# Patient Record
Sex: MALE | Race: OTHER
[De-identification: names, ages, dates, MRNs, and addresses within clinical notes are randomized per-mention and may not be internally consistent; named-entity substitution may affect disease eponyms.]

---

## 2020-01-15 PROBLEM — Z00.00 ENCOUNTER FOR PREVENTIVE HEALTH EXAMINATION: Status: ACTIVE | Noted: 2020-01-15

## 2020-01-26 DIAGNOSIS — Z86.39 PERSONAL HISTORY OF OTHER ENDOCRINE, NUTRITIONAL AND METABOLIC DISEASE: ICD-10-CM

## 2020-01-26 DIAGNOSIS — Q21.2 ATRIOVENTRICULAR SEPTAL DEFECT: ICD-10-CM

## 2020-01-26 DIAGNOSIS — Z86.79 PERSONAL HISTORY OF OTHER DISEASES OF THE CIRCULATORY SYSTEM: ICD-10-CM

## 2020-01-26 DIAGNOSIS — Q90.9 DOWN SYNDROME, UNSPECIFIED: ICD-10-CM

## 2020-01-28 DIAGNOSIS — Z87.2 PERSONAL HISTORY OF DISEASES OF THE SKIN AND SUBCUTANEOUS TISSUE: ICD-10-CM

## 2020-01-30 ENCOUNTER — APPOINTMENT (OUTPATIENT)
Dept: CARDIOLOGY | Facility: CLINIC | Age: 25
End: 2020-01-30
Payer: COMMERCIAL

## 2020-01-30 ENCOUNTER — NON-APPOINTMENT (OUTPATIENT)
Age: 25
End: 2020-01-30

## 2020-01-30 VITALS
WEIGHT: 227 LBS | HEIGHT: 60 IN | TEMPERATURE: 97.9 F | DIASTOLIC BLOOD PRESSURE: 86 MMHG | OXYGEN SATURATION: 96 % | SYSTOLIC BLOOD PRESSURE: 122 MMHG | BODY MASS INDEX: 44.57 KG/M2 | HEART RATE: 71 BPM

## 2020-01-30 DIAGNOSIS — I34.0 NONRHEUMATIC MITRAL (VALVE) INSUFFICIENCY: ICD-10-CM

## 2020-01-30 DIAGNOSIS — I45.10 UNSPECIFIED RIGHT BUNDLE-BRANCH BLOCK: ICD-10-CM

## 2020-01-30 DIAGNOSIS — R06.02 SHORTNESS OF BREATH: ICD-10-CM

## 2020-01-30 DIAGNOSIS — Q24.9 CONGENITAL MALFORMATION OF HEART, UNSPECIFIED: ICD-10-CM

## 2020-01-30 PROCEDURE — 99205 OFFICE O/P NEW HI 60 MIN: CPT

## 2020-01-30 PROCEDURE — 93000 ELECTROCARDIOGRAM COMPLETE: CPT

## 2020-02-01 PROBLEM — R06.02 SHORTNESS OF BREATH: Status: ACTIVE | Noted: 2020-02-01

## 2020-02-01 PROBLEM — I45.10 RIGHT BUNDLE BRANCH BLOCK (RBBB): Status: ACTIVE | Noted: 2020-02-01

## 2020-02-01 PROBLEM — I34.0 MITRAL REGURGITATION: Status: ACTIVE | Noted: 2020-01-26

## 2020-02-01 PROBLEM — I45.10 RIGHT BUNDLE BRANCH BLOCK: Status: RESOLVED | Noted: 2020-02-01 | Resolved: 2020-02-01

## 2020-02-01 PROBLEM — Q24.9 CONGENITAL HEART DISEASE: Status: ACTIVE | Noted: 2020-01-26

## 2020-02-01 NOTE — DISCUSSION/SUMMARY
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 Surgical intervention was performed at 5 months of age. Since that time has been no cardiac-related event. In 11/18 echocardiogram demonstrated mild mitral tricuspid and aortic insufficiency. Right and left ventricular systolic function was normal. The present cardiac his examination is not suggestive of hemodynamically significant valvular pathology. Echocardiography will be helpful to reassess left ventricular and valvular function.\par \par I have recommended the following:\par 1 echocardiogram\par 2 Routine laboratory studies including lipid profile electrolytes glucose levels etc. through primary care Dr. Atkinson\par \par \par \par Right bundle branch block\par The presence of a right bundle branch block and left axis deviation are consistent with conduction system disease secondary to underlying congenital heart disease following surgical correction. Permanent pacemaker implantation is not required in the absence of symptomatic bradycardia or high degree AV block.\par \par I have recommended the following:\par 1 no further cardiac testing for this problem at this time\par \par \par \par \par Shortness of breath\par The working diagnosis is dyspnea on exertion secondary to obesity and deconditioning. The history is consistent with this diagnosis. The differential diagnosis would include left ventricular systolic dysfunction/cardiomyopathy. However the physical findings and history are not suggestive of this diagnosis.\par \par \par I have recommended the following:\par 1. Low-salt low-fat low-cholesterol diet. Regular aerobic exercise. Weight loss\par 2 echocardiogram\par \par \par \par \par Obesity\par Obesity exacerbates Jagjit's  cardiovascular issues. Today Mr. San is 5 feet tall and weighs 227 pounds. Diet exercise and weight loss are advised.\par \par \par \par \par \par The diagnosis, prognosis,risks  options and alternatives were explained at length to both the patient and his family. All questions were answered. Issues discussed included congenital heart disease/mitral insufficiency right bundle branch block/abnormal electrocardiogram shortness of breath and obesity.\par \par

## 2020-02-01 NOTE — PHYSICAL EXAM
[Normal Conjunctiva] : the conjunctiva exhibited no abnormalities [Heart Rate And Rhythm] : heart rate and rhythm were normal [Heart Sounds] : normal S1 and S2 [Auscultation Breath Sounds / Voice Sounds] : lungs were clear to auscultation bilaterally [Bowel Sounds] : normal bowel sounds [Abdomen Soft] : soft [Abnormal Walk] : normal gait [Abdomen Tenderness] : non-tender [Affect] : the affect was normal [] : no rash [FreeTextEntry1] : pleasant

## 2020-02-01 NOTE — HISTORY OF PRESENT ILLNESS
[FreeTextEntry1] : 24-year-old man\par Cardiology consultation requested because of endocardial cushion defect/congenital heart disease.\par \par Mr. San was born with a trisomy 21 defect/Down's syndrome with an associated endocardial cushion defect. Surgical intervention was performed at 5 months of age. Since that time he's been followed regularly by cardiology/echocardiograms.\par \par Jagjit denies chest pain, palpitations or syncope. Dyspnea on exertion is attributed to obesity and is unchanged from in the past.\par \par There is no history of diabetes hypertension or hyperlipidemia.\par \darrell Danielson presents today for cardiovascular evaluation. He is accompanied by his mother

## 2020-02-01 NOTE — REVIEW OF SYSTEMS
[Feeling Fatigued] : feeling fatigued [Shortness Of Breath] : shortness of breath [see HPI] : see HPI [Joint Pain] : joint pain [Negative] : Heme/Lymph

## 2020-02-20 ENCOUNTER — APPOINTMENT (OUTPATIENT)
Dept: CARDIOLOGY | Facility: CLINIC | Age: 25
End: 2020-02-20

## 2021-11-02 ENCOUNTER — APPOINTMENT (OUTPATIENT)
Dept: BARIATRICS/WEIGHT MGMT | Facility: CLINIC | Age: 26
End: 2021-11-02

## 2021-12-28 ENCOUNTER — APPOINTMENT (OUTPATIENT)
Dept: BARIATRICS/WEIGHT MGMT | Facility: CLINIC | Age: 26
End: 2021-12-28

## 2022-02-07 ENCOUNTER — APPOINTMENT (OUTPATIENT)
Dept: BARIATRICS/WEIGHT MGMT | Facility: CLINIC | Age: 27
End: 2022-02-07

## 2022-06-30 ENCOUNTER — APPOINTMENT (OUTPATIENT)
Dept: BARIATRICS/WEIGHT MGMT | Facility: CLINIC | Age: 27
End: 2022-06-30

## 2022-06-30 ENCOUNTER — TRANSCRIPTION ENCOUNTER (OUTPATIENT)
Age: 27
End: 2022-06-30

## 2022-06-30 VITALS
HEART RATE: 68 BPM | HEIGHT: 60 IN | DIASTOLIC BLOOD PRESSURE: 84 MMHG | BODY MASS INDEX: 48.59 KG/M2 | WEIGHT: 247.5 LBS | OXYGEN SATURATION: 97 % | TEMPERATURE: 97.6 F | RESPIRATION RATE: 18 BRPM | SYSTOLIC BLOOD PRESSURE: 110 MMHG

## 2022-06-30 DIAGNOSIS — E55.9 VITAMIN D DEFICIENCY, UNSPECIFIED: ICD-10-CM

## 2022-06-30 PROCEDURE — 99203 OFFICE O/P NEW LOW 30 MIN: CPT

## 2022-06-30 NOTE — HISTORY OF PRESENT ILLNESS
[FreeTextEntry1] : 27 year old male accompanied by his mother for weight management consultation.\par Patient has struggled with his weight for approx 5 years.  Has not tried any weight loss programs/diets in the past\par Lowest weight in the past 3 years 240 pounds, highest weight 247\darrell Lives with his mother.  \darrell Attends a program daily.  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  Wakes up daily at 7AM for his program\darrell Had been exercising at the gym prior to COVID under supervision of an aid\par Drinks diet soda during the day\par Patient feels he can handle his weight by himself.  Started doing some exercises once weekly- strengthening at home.\par +h/o prediabetes 2 years ago.  Due for a physical this year with Dr. Atkinson at [facility] in a few weeks. \par BCA- 40.7% fat, fat mass 100.8 pounds, , .8 pounds, TBW % 48.3, BMR 2078

## 2022-06-30 NOTE — PHYSICAL EXAM
[Obese, well nourished, in no acute distress] : obese, well nourished, in no acute distress [Normal] : no stigmata of Cushing Syndrome [de-identified] : 1/6 murmur

## 2022-06-30 NOTE — ASSESSMENT
[FreeTextEntry1] : 27 year old male with morbid obesity, h/o prediabetes and down syndrome\par Discussion with patient about how his excess weight can negatively impact his health\par Will eat breakfast daily +protein, focus on having a prepared snack in the evening with smaller portion of sweets and fruit.  Find ways to increase vegetables such as carrots/celery and hummus at lunch\par Will find a new aid to assist in going to the gym for supervised exercise multiple times a week- mixed cardio and strength training\par Can consider medications pending lab results- possibly metformin or a GLP-1RA- need to work on lifestyle changes first \par Will refer to RD\par Mother inquired about bariatric surgery- he qualifies by weight but would need his buy in which is not the case at this time\par F/U 2 weeks

## 2022-07-08 ENCOUNTER — TRANSCRIPTION ENCOUNTER (OUTPATIENT)
Age: 27
End: 2022-07-08

## 2022-07-08 LAB
25(OH)D3 SERPL-MCNC: 30.5 NG/ML
ALBUMIN SERPL ELPH-MCNC: 4 G/DL
ALP BLD-CCNC: 95 U/L
ALT SERPL-CCNC: 31 U/L
ANION GAP SERPL CALC-SCNC: 9 MMOL/L
AST SERPL-CCNC: 27 U/L
BASOPHILS # BLD AUTO: 0.05 K/UL
BASOPHILS NFR BLD AUTO: 1 %
BILIRUB SERPL-MCNC: 0.6 MG/DL
BUN SERPL-MCNC: 13 MG/DL
CALCIUM SERPL-MCNC: 8.9 MG/DL
CHLORIDE SERPL-SCNC: 105 MMOL/L
CHOLEST SERPL-MCNC: 147 MG/DL
CO2 SERPL-SCNC: 27 MMOL/L
CREAT SERPL-MCNC: 1.01 MG/DL
EGFR: 105 ML/MIN/1.73M2
EOSINOPHIL # BLD AUTO: 0.02 K/UL
EOSINOPHIL NFR BLD AUTO: 0.4 %
ESTIMATED AVERAGE GLUCOSE: 111 MG/DL
GLUCOSE SERPL-MCNC: 104 MG/DL
HBA1C MFR BLD HPLC: 5.5 %
HCT VFR BLD CALC: 45.3 %
HDLC SERPL-MCNC: 40 MG/DL
HGB BLD-MCNC: 14.9 G/DL
IMM GRANULOCYTES NFR BLD AUTO: 0.2 %
LDLC SERPL CALC-MCNC: 81 MG/DL
LYMPHOCYTES # BLD AUTO: 2.07 K/UL
LYMPHOCYTES NFR BLD AUTO: 42.7 %
MAN DIFF?: NORMAL
MCHC RBC-ENTMCNC: 32.9 GM/DL
MCHC RBC-ENTMCNC: 33.7 PG
MCV RBC AUTO: 102.5 FL
MONOCYTES # BLD AUTO: 0.53 K/UL
MONOCYTES NFR BLD AUTO: 10.9 %
NEUTROPHILS # BLD AUTO: 2.17 K/UL
NEUTROPHILS NFR BLD AUTO: 44.8 %
NONHDLC SERPL-MCNC: 107 MG/DL
PLATELET # BLD AUTO: 225 K/UL
POTASSIUM SERPL-SCNC: 4.4 MMOL/L
PROT SERPL-MCNC: 7.1 G/DL
RBC # BLD: 4.42 M/UL
RBC # FLD: 13.8 %
SODIUM SERPL-SCNC: 141 MMOL/L
TRIGL SERPL-MCNC: 130 MG/DL
TSH SERPL-ACNC: 6.79 UIU/ML
WBC # FLD AUTO: 4.85 K/UL

## 2022-07-15 ENCOUNTER — APPOINTMENT (OUTPATIENT)
Dept: BARIATRICS/WEIGHT MGMT | Facility: CLINIC | Age: 27
End: 2022-07-15

## 2022-07-15 DIAGNOSIS — R73.03 PREDIABETES.: ICD-10-CM

## 2022-07-15 PROCEDURE — 99212 OFFICE O/P EST SF 10 MIN: CPT | Mod: 95

## 2022-07-15 NOTE — ASSESSMENT
[FreeTextEntry1] : 27 year old male with h/o Down Syndrome, prediabetes now resolved, elevated TSH and morbid obesity\par Reinforced dietary changes- will work with RD\par Will have TFT's redrawn at PCP or at [facility]- if abnormal discussed initiating treatment with levothyroxine and s/s of hypothyroidism\par Continue to work to increase exercise\par F/U with me PRN

## 2022-07-15 NOTE — HISTORY OF PRESENT ILLNESS
[FreeTextEntry1] : 27 year old male accompanied by his mother for weight management consultation.\par Patient has struggled with his weight for approx 5 years.  Has not tried any weight loss programs/diets in the past\par Lowest weight in the past 3 years 240 pounds, highest weight 247\darrell Lives with his mother.  \darrell Attends a program daily.  Mother packs him breakfast (banana), packs lunch 3x a week, he purchases fast food through his program 2x a week (chicken nuggets and french fries), mother cooks dinner fish, vegeatbles, sweet potato, large snack in the evening- ice cream 3 servings or waffles (2) around 10pm.\par Stays up late a night- plays [employer], watches you tube until 2AM.  Wakes up daily at 7AM for his program\par Had been exercising at the gym prior to COVID under supervision of an aid\par Drinks diet soda during the day\par Patient feels he can handle his weight by himself.  Started doing some exercises once weekly- strengthening at home.\par +h/o prediabetes 2 years ago.  Due for a physical this year with Dr. Atkinson at [facility] in a few weeks. \par BCA- 40.7% fat, fat mass 100.8 pounds, , .8 pounds, TBW % 48.3, BMR 2078\par \par 7/15/22- Patient for virtual visit today.  He is not present as he is already on the bus going to his program.  His mother wants to proceed forward to discuss his progress.  Jagjit has declined to be weighed since his last visit with me.  He has cut down on his soda, simple carbohydrate rich foods, fatty foods, increased his salads with dinner, decreased his nighttime snacking.  His mother has met with the  to discuss avoiding fast food during the day.  Has been walking with his program but no formal exercise.  His mother is concerned about disrupting his routine too quickly.  Has visit with his PCP next week.  Did not receive my messages about elevated TSH level.

## 2022-07-15 NOTE — REASON FOR VISIT
[Home] : at home, [unfilled] , at the time of the visit. [Other Location: e.g. Home (Enter Location, City,State)___] : at [unfilled] [FreeTextEntry2] : Milly San [FreeTextEntry3] : Mother

## 2022-07-26 ENCOUNTER — APPOINTMENT (OUTPATIENT)
Dept: BARIATRICS/WEIGHT MGMT | Facility: CLINIC | Age: 27
End: 2022-07-26

## 2022-12-10 ENCOUNTER — APPOINTMENT (OUTPATIENT)
Dept: BARIATRICS/WEIGHT MGMT | Facility: CLINIC | Age: 27
End: 2022-12-10

## 2022-12-10 VITALS — BODY MASS INDEX: 49.41 KG/M2 | WEIGHT: 253 LBS

## 2022-12-10 PROCEDURE — 99214 OFFICE O/P EST MOD 30 MIN: CPT | Mod: 95

## 2022-12-10 RX ORDER — CONTAINER,EMPTY
EACH MISCELLANEOUS
Qty: 1 | Refills: 0 | Status: ACTIVE | COMMUNITY
Start: 2022-12-10 | End: 1900-01-01

## 2022-12-10 RX ORDER — ISOPROPYL ALCOHOL 70 ML/100ML
SWAB TOPICAL
Qty: 1 | Refills: 3 | Status: ACTIVE | COMMUNITY
Start: 2022-12-10 | End: 1900-01-01

## 2022-12-10 NOTE — HISTORY OF PRESENT ILLNESS
[FreeTextEntry1] : 27 year old male accompanied by his mother for weight management consultation.\par Patient has struggled with his weight for approx 5 years.  Has not tried any weight loss programs/diets in the past\par Lowest weight in the past 3 years 240 pounds, highest weight 247\darrell Lives with his mother.  \darrell Attends a program daily.  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  Wakes up daily at 7AM for his program\par Had been exercising at the gym prior to COVID under supervision of an aid\par Drinks diet soda during the day\par Patient feels he can handle his weight by himself.  Started doing some exercises once weekly- strengthening at home.\par +h/o prediabetes 2 years ago.  Due for a physical this year with Dr. Atkinson at [facility] in a few weeks. \par BCA- 40.7% fat, fat mass 100.8 pounds, , .8 pounds, TBW % 48.3, BMR 2078\par \par 7/15/22- Patient for virtual visit today.  He is not present as he is already on the bus going to his program.  His mother wants to proceed forward to discuss his progress.  Jagjit has declined to be weighed since his last visit with me.  He has cut down on his soda, simple carbohydrate rich foods, fatty foods, increased his salads with dinner, decreased his nighttime snacking.  His mother has met with the  to discuss avoiding fast food during the day.  Has been walking with his program but no formal exercise.  His mother is concerned about disrupting his routine too quickly.  Has visit with his PCP next week.  Did not receive my messages about elevated TSH level.  \par \par 12/10/22- Patient has been taking levothyroxine 25mcg/day repeat TFTs pending.  +6 pounds.  Not exercising.  Eating patterns are the same- program administrators have said they are going to help but have not made a meaningful impact.  +evening eating.  This session was with the patient's mother/HCP/Guardian

## 2022-12-10 NOTE — ASSESSMENT
[FreeTextEntry1] : 27 year old male with down syndrome and morbid obesity\par Milly is interested in weight loss medications for Jagjit.  She is currently enrolled in our program and taking Saxenda with good effect.  Discussed unique issues of starting weight loss medications for Jagjit including compliance, risk of dehydration, GI side effects if eating unhealthy foods, ignoring satiety signals\par Discussed important foundation for weight loss is exercising and healthier diet but practical issues are preventing success\par Patient has known congenital heart issues and risk of increased weight may exacerbate risk of heart disease \par Decided that we can try once weekly GLP-1 (Wegovy) once TFT's are confirmed to be WNL- reviewed possible side effects of Wegovy including nausea, vomiting, diarrhea, constipation, GERD, pancreatitis, MTC, gall stones, and dehydration.  Reviewed injection sites, technique, storage, needle safety.\par She will discuss these possible side effects and overall weight loss goals with Jagjit who was not available for today's visit.  I offered to discuss these with him myself and answer any questions.\par F/U 1 month

## 2022-12-10 NOTE — REASON FOR VISIT
[Home] : at home, [unfilled] , at the time of the visit. [Other Location: e.g. Home (Enter Location, City,State)___] : at [unfilled] [FreeTextEntry2] : Milly San [FreeTextEntry3] : Mother  [Follow-Up] : a follow-up visit

## 2023-03-03 ENCOUNTER — APPOINTMENT (OUTPATIENT)
Dept: BARIATRICS/WEIGHT MGMT | Facility: CLINIC | Age: 28
End: 2023-03-03
Payer: COMMERCIAL

## 2023-03-03 VITALS — WEIGHT: 223 LBS | BODY MASS INDEX: 43.55 KG/M2

## 2023-03-03 PROCEDURE — 99213 OFFICE O/P EST LOW 20 MIN: CPT | Mod: 95

## 2023-03-03 NOTE — REASON FOR VISIT
[Home] : at home, [unfilled] , at the time of the visit. [Other Location: e.g. Home (Enter Location, City,State)___] : at [unfilled] [Mother] : mother [FreeTextEntry2] : Milly San [FreeTextEntry3] : Mother/Legal guardian [Follow-Up] : a follow-up visit

## 2023-03-03 NOTE — ASSESSMENT
[FreeTextEntry1] : 27 year old male with morbid obesity\par Patient is well over targets of weight loss- we will continue Wegovy 0.5mg/week and not increase.  We discussed possibility of side effects of higher dose and may make him resistant to therapy.  May reconsider once weight starts to plateau\par Continue emphasis on home cooked meals, making better choices when out, minful eating, adequate hydration\par Continue to encourage regular exercise\par Patient to be present at next visit in 1 month

## 2023-03-03 NOTE — HISTORY OF PRESENT ILLNESS
[FreeTextEntry1] : 27 year old male accompanied by his mother for weight management consultation.\par Patient has struggled with his weight for approx 5 years.  Has not tried any weight loss programs/diets in the past\par Lowest weight in the past 3 years 240 pounds, highest weight 247\darrell Lives with his mother.  \darrell Attends a program daily.  Mother packs him breakfast (banana), packs lunch 3x a week, he purchases fast food through his program 2x a week (chicken nuggets and french fries), mother cooks dinner fish, vegeatbles, sweet potato, large snack in the evening- ice cream 3 servings or waffles (2) around 10pm.\par Stays up late a night- plays [employer], watches you tube until 2AM.  Wakes up daily at 7AM for his program\par Had been exercising at the gym prior to COVID under supervision of an aid\par Drinks diet soda during the day\par Patient feels he can handle his weight by himself.  Started doing some exercises once weekly- strengthening at home.\par +h/o prediabetes 2 years ago.  Due for a physical this year with Dr. Atkinson at [facility] in a few weeks. \par BCA- 40.7% fat, fat mass 100.8 pounds, , .8 pounds, TBW % 48.3, BMR 2078\par \par 7/15/22- Patient for virtual visit today.  He is not present as he is already on the bus going to his program.  His mother wants to proceed forward to discuss his progress.  Jagjit has declined to be weighed since his last visit with me.  He has cut down on his soda, simple carbohydrate rich foods, fatty foods, increased his salads with dinner, decreased his nighttime snacking.  His mother has met with the  to discuss avoiding fast food during the day.  Has been walking with his program but no formal exercise.  His mother is concerned about disrupting his routine too quickly.  Has visit with his PCP next week.  Did not receive my messages about elevated TSH level.  \par \par 12/10/22- Patient has been taking levothyroxine 25mcg/day repeat TFTs pending.  +6 pounds.  Not exercising.  Eating patterns are the same- program administrators have said they are going to help but have not made a meaningful impact.  +evening eating.  This session was with the patient's mother/HCP/Guardian \par \par 3/3/23- Patient is at his group today and this appointment is with his mother Milly.  She reports Jagjit is doing very well on Wegovy 0.5mg/week.  Eating less portions and snacks.  Good hydration.  Lifting some weights for exercise.  Still issues with him going to his group and eating fast food but Milly packs his lunch 3x a week and she cooks his other meals.  He is down about 30 pounds.  She feels he is ambivalent to his weight loss.  Reports he is taking levothyroxine and is euthyroid.

## 2023-03-09 ENCOUNTER — NON-APPOINTMENT (OUTPATIENT)
Age: 28
End: 2023-03-09

## 2023-04-12 ENCOUNTER — APPOINTMENT (OUTPATIENT)
Dept: BARIATRICS/WEIGHT MGMT | Facility: CLINIC | Age: 28
End: 2023-04-12
Payer: COMMERCIAL

## 2023-04-12 VITALS — WEIGHT: 216 LBS | BODY MASS INDEX: 42.19 KG/M2

## 2023-04-12 PROCEDURE — 99212 OFFICE O/P EST SF 10 MIN: CPT | Mod: 95

## 2023-04-12 NOTE — HISTORY OF PRESENT ILLNESS
[FreeTextEntry1] : 27 year old male accompanied by his mother for weight management consultation.\par Patient has struggled with his weight for approx 5 years.  Has not tried any weight loss programs/diets in the past\par Lowest weight in the past 3 years 240 pounds, highest weight 247\darrell Lives with his mother.  \darrell Attends a program daily.  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  Wakes up daily at 7AM for his program\par Had been exercising at the gym prior to COVID under supervision of an aid\par Drinks diet soda during the day\par Patient feels he can handle his weight by himself.  Started doing some exercises once weekly- strengthening at home.\par +h/o prediabetes 2 years ago.  Due for a physical this year with Dr. Atkinson at [facility] in a few weeks. \par BCA- 40.7% fat, fat mass 100.8 pounds, , .8 pounds, TBW % 48.3, BMR 2078\par \par 7/15/22- Patient for virtual visit today.  He is not present as he is already on the bus going to his program.  His mother wants to proceed forward to discuss his progress.  Jagjit has declined to be weighed since his last visit with me.  He has cut down on his soda, simple carbohydrate rich foods, fatty foods, increased his salads with dinner, decreased his nighttime snacking.  His mother has met with the  to discuss avoiding fast food during the day.  Has been walking with his program but no formal exercise.  His mother is concerned about disrupting his routine too quickly.  Has visit with his PCP next week.  Did not receive my messages about elevated TSH level.  \par \par 12/10/22- Patient has been taking levothyroxine 25mcg/day repeat TFTs pending.  +6 pounds.  Not exercising.  Eating patterns are the same- program administrators have said they are going to help but have not made a meaningful impact.  +evening eating.  This session was with the patient's mother/HCP/Guardian \par \par 3/3/23- Patient is at his group today and this appointment is with his mother Milly.  She reports Jagjit is doing very well on Wegovy 0.5mg/week.  Eating less portions and snacks.  Good hydration.  Lifting some weights for exercise.  Still issues with him going to his group and eating fast food but Milly packs his lunch 3x a week and she cooks his other meals.  He is down about 30 pounds.  She feels he is ambivalent to his weight loss.  Reports he is taking levothyroxine and is euthyroid.  \par \par 4/12/23- Patient -7 pounds.  Doing well on Wegovy 0.5mg/week except having some epigastric abd pain today- told his mother that he ate too much.  No feeds, n/v.  Told his mother he was feeling better from this morning.  First episode of this issue.  Constipation had resolved from the last conversation without intervention.  Walking and doing squats.  Feeling good about his weight loss.  Reports thyroid is well controlled.  Eating less portions.  Good water intake.

## 2023-04-12 NOTE — REASON FOR VISIT
[Follow-Up] : a follow-up visit [Home] : at home, [unfilled] , at the time of the visit. [Medical Office: ([facility])___] : at the medical office located in  [Mother] : mother [FreeTextEntry2] : Mother [FreeTextEntry3] : Mother

## 2023-04-15 ENCOUNTER — APPOINTMENT (OUTPATIENT)
Dept: BARIATRICS/WEIGHT MGMT | Facility: CLINIC | Age: 28
End: 2023-04-15

## 2023-05-13 ENCOUNTER — APPOINTMENT (OUTPATIENT)
Dept: BARIATRICS/WEIGHT MGMT | Facility: CLINIC | Age: 28
End: 2023-05-13

## 2023-06-10 ENCOUNTER — APPOINTMENT (OUTPATIENT)
Dept: BARIATRICS/WEIGHT MGMT | Facility: CLINIC | Age: 28
End: 2023-06-10
Payer: COMMERCIAL

## 2023-06-10 VITALS — WEIGHT: 212 LBS | BODY MASS INDEX: 41.4 KG/M2

## 2023-06-10 DIAGNOSIS — E66.9 OBESITY, UNSPECIFIED: ICD-10-CM

## 2023-06-10 PROCEDURE — 99213 OFFICE O/P EST LOW 20 MIN: CPT | Mod: 95

## 2023-06-10 NOTE — ASSESSMENT
[FreeTextEntry1] : 28 year old male with class II obesity and hypothyroidism\par Hypothyroidism- continue levothyroxine 25mcg daily and repeat TFT's\par Obesity- continue Wegovy 1mg/week- can increase to 1.7mg/week if supply issues.  Make sure to have 2 servings of fruits and 2 servings of vegetables/day- start MVI, continue adequtae protein and hydration\par Good exercise regimen\par F/U 2 months telehealth (family preference)

## 2023-06-10 NOTE — HISTORY OF PRESENT ILLNESS
[FreeTextEntry1] : 27 year old male accompanied by his mother for weight management consultation.\par Patient has struggled with his weight for approx 5 years.  Has not tried any weight loss programs/diets in the past\par Lowest weight in the past 3 years 240 pounds, highest weight 247\darrell Lives with his mother.  \darrell Attends a program daily.  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  Wakes up daily at 7AM for his program\par Had been exercising at the gym prior to COVID under supervision of an aid\par Drinks diet soda during the day\par Patient feels he can handle his weight by himself.  Started doing some exercises once weekly- strengthening at home.\par +h/o prediabetes 2 years ago.  Due for a physical this year with Dr. Atkinson at [facility] in a few weeks. \par BCA- 40.7% fat, fat mass 100.8 pounds, , .8 pounds, TBW % 48.3, BMR 2078\par \par 7/15/22- Patient for virtual visit today.  He is not present as he is already on the bus going to his program.  His mother wants to proceed forward to discuss his progress.  Jagjit has declined to be weighed since his last visit with me.  He has cut down on his soda, simple carbohydrate rich foods, fatty foods, increased his salads with dinner, decreased his nighttime snacking.  His mother has met with the  to discuss avoiding fast food during the day.  Has been walking with his program but no formal exercise.  His mother is concerned about disrupting his routine too quickly.  Has visit with his PCP next week.  Did not receive my messages about elevated TSH level.  \par \par 12/10/22- Patient has been taking levothyroxine 25mcg/day repeat TFTs pending.  +6 pounds.  Not exercising.  Eating patterns are the same- program administrators have said they are going to help but have not made a meaningful impact.  +evening eating.  This session was with the patient's mother/HCP/Guardian \par \par 3/3/23- Patient is at his group today and this appointment is with his mother Milly.  She reports Jagjit is doing very well on Wegovy 0.5mg/week.  Eating less portions and snacks.  Good hydration.  Lifting some weights for exercise.  Still issues with him going to his group and eating fast food but Milly packs his lunch 3x a week and she cooks his other meals.  He is down about 30 pounds.  She feels he is ambivalent to his weight loss.  Reports he is taking levothyroxine and is euthyroid.  \par \par 4/12/23- Patient -7 pounds.  Doing well on Wegovy 0.5mg/week except having some epigastric abd pain today- told his mother that he ate too much.  No feeds, n/v.  Told his mother he was feeling better from this morning.  First episode of this issue.  Constipation had resolved from the last conversation without intervention.  Walking and doing squats.  Feeling good about his weight loss.  Reports thyroid is well controlled.  Eating less portions.  Good water intake.  \par \par 6/10/23- Patient having no negative effects on Wegovy 1mg/week.  States he is doing strength training daily and focusing on a different body part each day.  Also doing walking at his program.  Mother is packing him lunches except on Fridays which he has [employer].  Breakfasts are eggs and bananas.  Likes broccoli and small peppers.  Likes fruits.  Good hydration and protein intake.

## 2023-06-10 NOTE — REASON FOR VISIT
[Home] : at home, [unfilled] , at the time of the visit. [Other Location: e.g. Home (Enter Location, City,State)___] : at [unfilled] [Patient] : the patient [Self] : self [This encounter was initiated by telehealth (audio with video) and converted to telephone (audio only) due to technical difficulties.] : This encounter was initiated by telehealth (audio with video) and converted to telephone (audio only) due to technical difficulties. [Follow-Up] : a follow-up visit [Parent] : parent

## 2023-06-28 LAB
25(OH)D3 SERPL-MCNC: 14.6 NG/ML
ALBUMIN SERPL ELPH-MCNC: 4.2 G/DL
ALP BLD-CCNC: 101 U/L
ALT SERPL-CCNC: 24 U/L
ANION GAP SERPL CALC-SCNC: 13 MMOL/L
AST SERPL-CCNC: 21 U/L
BILIRUB SERPL-MCNC: 0.6 MG/DL
BUN SERPL-MCNC: 14 MG/DL
CALCIUM SERPL-MCNC: 9.1 MG/DL
CHLORIDE SERPL-SCNC: 102 MMOL/L
CHOLEST SERPL-MCNC: 128 MG/DL
CO2 SERPL-SCNC: 25 MMOL/L
CREAT SERPL-MCNC: 1.06 MG/DL
EGFR: 98 ML/MIN/1.73M2
ESTIMATED AVERAGE GLUCOSE: 94 MG/DL
FOLATE SERPL-MCNC: 5.3 NG/ML
GLUCOSE SERPL-MCNC: 93 MG/DL
HBA1C MFR BLD HPLC: 4.9 %
HDLC SERPL-MCNC: 44 MG/DL
IRON SATN MFR SERPL: 44 %
IRON SERPL-MCNC: 130 UG/DL
LDLC SERPL CALC-MCNC: 61 MG/DL
NONHDLC SERPL-MCNC: 83 MG/DL
POTASSIUM SERPL-SCNC: 4 MMOL/L
PROT SERPL-MCNC: 7.1 G/DL
SODIUM SERPL-SCNC: 140 MMOL/L
T3 SERPL-MCNC: 153 NG/DL
T4 FREE SERPL-MCNC: 1.1 NG/DL
TIBC SERPL-MCNC: 295 UG/DL
TRIGL SERPL-MCNC: 111 MG/DL
TSH SERPL-ACNC: 7.02 UIU/ML
UIBC SERPL-MCNC: 165 UG/DL
VIT B12 SERPL-MCNC: 324 PG/ML

## 2023-06-28 RX ORDER — LEVOTHYROXINE SODIUM 0.05 MG/1
50 TABLET ORAL DAILY
Qty: 30 | Refills: 11 | Status: ACTIVE | COMMUNITY
Start: 2023-03-03 | End: 1900-01-01

## 2023-06-28 RX ORDER — CHOLECALCIFEROL (VITAMIN D3) 1250 MCG
1.25 MG CAPSULE ORAL
Qty: 12 | Refills: 0 | Status: ACTIVE | COMMUNITY
Start: 2023-06-28 | End: 1900-01-01

## 2023-06-30 LAB — VIT B1 SERPL-MCNC: 97.6 NMOL/L

## 2023-07-01 LAB
A-TOCOPHEROL VIT E SERPL-MCNC: 8.2 MG/L
BETA+GAMMA TOCOPHEROL SERPL-MCNC: 1.1 MG/L
VIT A SERPL-MCNC: 39.4 UG/DL

## 2023-08-09 ENCOUNTER — APPOINTMENT (OUTPATIENT)
Dept: BARIATRICS/WEIGHT MGMT | Facility: CLINIC | Age: 28
End: 2023-08-09
Payer: COMMERCIAL

## 2023-08-09 VITALS — BODY MASS INDEX: 40.66 KG/M2 | WEIGHT: 208.2 LBS

## 2023-08-09 DIAGNOSIS — E66.01 MORBID (SEVERE) OBESITY DUE TO EXCESS CALORIES: ICD-10-CM

## 2023-08-09 DIAGNOSIS — E03.9 HYPOTHYROIDISM, UNSPECIFIED: ICD-10-CM

## 2023-08-09 DIAGNOSIS — R79.89 OTHER SPECIFIED ABNORMAL FINDINGS OF BLOOD CHEMISTRY: ICD-10-CM

## 2023-08-09 PROCEDURE — 99213 OFFICE O/P EST LOW 20 MIN: CPT

## 2023-08-09 NOTE — HISTORY OF PRESENT ILLNESS
[FreeTextEntry1] : 27 year old male accompanied by his mother for weight management consultation. Patient has struggled with his weight for approx 5 years.  Has not tried any weight loss programs/diets in the past Lowest weight in the past 3 years 240 pounds, highest weight 247 Lives with his mother.   Attends a program daily.  Mother packs him breakfast (banana), packs lunch 3x a week, he purchases fast food through his program 2x a week (chicken nuggets and french fries), mother cooks dinner fish, vegeatbles, sweet potato, large snack in the evening- ice cream 3 servings or waffles (2) around 10pm. Stays up late a night- plays [employer], watches you tube until 2AM.  Wakes up daily at 7AM for his program Had been exercising at the gym prior to [facility] under supervision of an aid Drinks diet soda during the day Patient feels he can handle his weight by himself.  Started doing some exercises once weekly- strengthening at home. +h/o prediabetes 2 years ago.  Due for a physical this year with Dr. Atkinson at [facility] in a few weeks.  BCA- 40.7% fat, fat mass 100.8 pounds, , .8 pounds, TBW % 48.3, BMR 2078  7/15/22- Patient for virtual visit today.  He is not present as he is already on the bus going to his program.  His mother wants to proceed forward to discuss his progress.  Jagjit has declined to be weighed since his last visit with me.  He has cut down on his soda, simple carbohydrate rich foods, fatty foods, increased his salads with dinner, decreased his nighttime snacking.  His mother has met with the  to discuss avoiding fast food during the day.  Has been walking with his program but no formal exercise.  His mother is concerned about disrupting his routine too quickly.  Has visit with his PCP next week.  Did not receive my messages about elevated TSH level.    12/10/22- Patient has been taking levothyroxine 25mcg/day repeat TFTs pending.  +6 pounds.  Not exercising.  Eating patterns are the same- program administrators have said they are going to help but have not made a meaningful impact.  +evening eating.  This session was with the patient's mother/HCP/Guardian   3/3/23- Patient is at his group today and this appointment is with his mother Milly.  She reports Jagjit is doing very well on Wegovy 0.5mg/week.  Eating less portions and snacks.  Good hydration.  Lifting some weights for exercise.  Still issues with him going to his group and eating fast food but Milly packs his lunch 3x a week and she cooks his other meals.  He is down about 30 pounds.  She feels he is ambivalent to his weight loss.  Reports he is taking levothyroxine and is euthyroid.    4/12/23- Patient -7 pounds.  Doing well on Wegovy 0.5mg/week except having some epigastric abd pain today- told his mother that he ate too much.  No feeds, n/v.  Told his mother he was feeling better from this morning.  First episode of this issue.  Constipation had resolved from the last conversation without intervention.  Walking and doing squats.  Feeling good about his weight loss.  Reports thyroid is well controlled.  Eating less portions.  Good water intake.    6/10/23- Patient having no negative effects on Wegovy 1mg/week.  States he is doing strength training daily and focusing on a different body part each day.  Also doing walking at his program.  Mother is packing him lunches except on Fridays which he has [employer].  Breakfasts are eggs and bananas.  Likes broccoli and small peppers.  Likes fruits.  Good hydration and protein intake.    8/9/23- Doing well on Wegovy 1mg/week- picked up box of 1.7mg but hasn't started yet.  -4 pounds since last appointment.  Saw his PCP- no medication changes.  Started levothyroxine 50mcg/day about 5 weeks ago- no negative side effects- denies palpitations, anxiety, diarrhea.  Sleep patterns remain irregular- stays up at night watching youtube.  Not eating overnight anymore.  Mother is packing his breakfast and making healthier choices at his group.  Walking daily at his group and doing strength training (weights) at home in AM.  Denies constipation, n/v.

## 2023-08-09 NOTE — CONSULT LETTER
[Dear  ___] : Dear  [unfilled], [Consult Letter:] : I had the pleasure of evaluating your patient, [unfilled]. [( Thank you for referring [unfilled] for consultation for _____ )] : Thank you for referring [unfilled] for consultation for [unfilled] [Please see my note below.] : Please see my note below. [Consult Closing:] : Thank you very much for allowing me to participate in the care of this patient.  If you have any questions, please do not hesitate to contact me. [Sincerely,] : Sincerely, [FreeTextEntry3] : Zoya Medina FNP-BC, [facility]

## 2023-08-09 NOTE — ASSESSMENT
[FreeTextEntry1] : 28 year old male with morbid obesity and hypothyroidism, vitamin D deficiency Morbid obesity- Increase Wegovy 1.7mg/week Discussed obesity as chronic disease  Declines RD- continue [place] preplanning and packing meals, avoiding evening snacking, good f/v intake and lean protein, avoiding processed foods Continue exercise regimen and adquate hydration Hypothyrodism- continue levothyroxine 50mcg/day Vitamin D deficiency- continue vitamin D 44661 units/week Will draw labs at the end of September to f/u thyroid and vitamin D

## 2024-01-02 RX ORDER — SEMAGLUTIDE 1.7 MG/.75ML
1.7 INJECTION, SOLUTION SUBCUTANEOUS
Qty: 1 | Refills: 0 | Status: ACTIVE | COMMUNITY
Start: 2022-12-10 | End: 1900-01-01

## 2024-05-15 ENCOUNTER — APPOINTMENT (OUTPATIENT)
Dept: BARIATRICS/WEIGHT MGMT | Facility: CLINIC | Age: 29
End: 2024-05-15

## 2024-07-10 ENCOUNTER — RX RENEWAL (OUTPATIENT)
Age: 29
End: 2024-07-10

## 2024-08-01 ENCOUNTER — APPOINTMENT (OUTPATIENT)
Dept: BARIATRICS/WEIGHT MGMT | Facility: CLINIC | Age: 29
End: 2024-08-01
Payer: COMMERCIAL

## 2024-08-01 VITALS
HEART RATE: 80 BPM | DIASTOLIC BLOOD PRESSURE: 82 MMHG | WEIGHT: 221.6 LBS | BODY MASS INDEX: 43.28 KG/M2 | SYSTOLIC BLOOD PRESSURE: 128 MMHG

## 2024-08-01 DIAGNOSIS — E55.9 VITAMIN D DEFICIENCY, UNSPECIFIED: ICD-10-CM

## 2024-08-01 PROCEDURE — G2211 COMPLEX E/M VISIT ADD ON: CPT | Mod: NC

## 2024-08-01 PROCEDURE — 99213 OFFICE O/P EST LOW 20 MIN: CPT

## 2024-08-05 LAB
25(OH)D3 SERPL-MCNC: 16.1 NG/ML
ALBUMIN SERPL ELPH-MCNC: 4.2 G/DL
ALP BLD-CCNC: 92 U/L
ALT SERPL-CCNC: 35 U/L
ANION GAP SERPL CALC-SCNC: 12 MMOL/L
AST SERPL-CCNC: 26 U/L
BILIRUB SERPL-MCNC: 0.7 MG/DL
BUN SERPL-MCNC: 18 MG/DL
CALCIUM SERPL-MCNC: 9.1 MG/DL
CHLORIDE SERPL-SCNC: 104 MMOL/L
CHOLEST SERPL-MCNC: 137 MG/DL
CO2 SERPL-SCNC: 22 MMOL/L
CREAT SERPL-MCNC: 1.13 MG/DL
EGFR: 90 ML/MIN/1.73M2
ESTIMATED AVERAGE GLUCOSE: 100 MG/DL
GLUCOSE SERPL-MCNC: 115 MG/DL
HBA1C MFR BLD HPLC: 5.1 %
HCT VFR BLD CALC: 42.6 %
HDLC SERPL-MCNC: 48 MG/DL
HGB BLD-MCNC: 15.2 G/DL
INSULIN P FAST SERPL-ACNC: 29.2 UU/ML
LDLC SERPL CALC-MCNC: 69 MG/DL
MCHC RBC-ENTMCNC: 33.6 PG
MCHC RBC-ENTMCNC: 35.7 GM/DL
MCV RBC AUTO: 94.2 FL
NONHDLC SERPL-MCNC: 89 MG/DL
PLATELET # BLD AUTO: 238 K/UL
POTASSIUM SERPL-SCNC: 3.6 MMOL/L
PROT SERPL-MCNC: 7.1 G/DL
RBC # BLD: 4.52 M/UL
RBC # FLD: 13.5 %
SODIUM SERPL-SCNC: 139 MMOL/L
T3 SERPL-MCNC: 139 NG/DL
T4 FREE SERPL-MCNC: 1.2 NG/DL
TRIGL SERPL-MCNC: 111 MG/DL
TSH SERPL-ACNC: 4.27 UIU/ML
WBC # FLD AUTO: 4.78 K/UL

## 2024-08-05 NOTE — HISTORY OF PRESENT ILLNESS
[FreeTextEntry1] : 27 year old male accompanied by his mother for weight management consultation. Patient has struggled with his weight for approx 5 years.  Has not tried any weight loss programs/diets in the past Lowest weight in the past 3 years 240 pounds, highest weight 247 Lives with his mother.   Attends a program daily.  Mother packs him breakfast (banana), packs lunch 3x a week, he purchases fast food through his program 2x a week (chicken nuggets and french fries), mother cooks dinner fish, vegeatbles, sweet potato, large snack in the evening- ice cream 3 servings or waffles (2) around 10pm. Stays up late a night- plays [employer], watches you tube until 2AM.  Wakes up daily at 7AM for his program Had been exercising at the gym prior to [facility] under supervision of an aid Drinks diet soda during the day Patient feels he can handle his weight by himself.  Started doing some exercises once weekly- strengthening at home. +h/o prediabetes 2 years ago.  Due for a physical this year with Dr. Atkinson at [facility] in a few weeks.  BCA- 40.7% fat, fat mass 100.8 pounds, , .8 pounds, TBW % 48.3, BMR 2078  7/15/22- Patient for virtual visit today.  He is not present as he is already on the bus going to his program.  His mother wants to proceed forward to discuss his progress.  Jagjit has declined to be weighed since his last visit with me.  He has cut down on his soda, simple carbohydrate rich foods, fatty foods, increased his salads with dinner, decreased his nighttime snacking.  His mother has met with the  to discuss avoiding fast food during the day.  Has been walking with his program but no formal exercise.  His mother is concerned about disrupting his routine too quickly.  Has visit with his PCP next week.  Did not receive my messages about elevated TSH level.    12/10/22- Patient has been taking levothyroxine 25mcg/day repeat TFTs pending.  +6 pounds.  Not exercising.  Eating patterns are the same- program administrators have said they are going to help but have not made a meaningful impact.  +evening eating.  This session was with the patient's mother/HCP/Guardian   3/3/23- Patient is at his group today and this appointment is with his mother Milly.  She reports Jagjit is doing very well on Wegovy 0.5mg/week.  Eating less portions and snacks.  Good hydration.  Lifting some weights for exercise.  Still issues with him going to his group and eating fast food but Milly packs his lunch 3x a week and she cooks his other meals.  He is down about 30 pounds.  She feels he is ambivalent to his weight loss.  Reports he is taking levothyroxine and is euthyroid.    4/12/23- Patient -7 pounds.  Doing well on Wegovy 0.5mg/week except having some epigastric abd pain today- told his mother that he ate too much.  No feeds, n/v.  Told his mother he was feeling better from this morning.  First episode of this issue.  Constipation had resolved from the last conversation without intervention.  Walking and doing squats.  Feeling good about his weight loss.  Reports thyroid is well controlled.  Eating less portions.  Good water intake.    6/10/23- Patient having no negative effects on Wegovy 1mg/week.  States he is doing strength training daily and focusing on a different body part each day.  Also doing walking at his program.  Mother is packing him lunches except on Fridays which he has [employer].  Breakfasts are eggs and bananas.  Likes broccoli and small peppers.  Likes fruits.  Good hydration and protein intake.    8/9/23- Doing well on Wegovy 1mg/week- picked up box of 1.7mg but hasn't started yet.  -4 pounds since last appointment.  Saw his PCP- no medication changes.  Started levothyroxine 50mcg/day about 5 weeks ago- no negative side effects- denies palpitations, anxiety, diarrhea.  Sleep patterns remain irregular- stays up at night watching youtube.  Not eating overnight anymore.  Mother is packing his breakfast and making healthier choices at his group.  Walking daily at his group and doing strength training (weights) at home in AM.  Denies constipation, n/v.    8/1/24- dumbells every morning, occasional walking for exercise in program.  Considering personal training as option.  Mother packing lunches 3x a week, 2 days eating out- takeout/pizza.  Rewarding himself with food.  Wetnt off Wegovy possibly due to constipation- unclear- does not recall having an issue.

## 2024-08-05 NOTE — HISTORY OF PRESENT ILLNESS
[FreeTextEntry1] : 27 year old male accompanied by his mother for weight management consultation. Patient has struggled with his weight for approx 5 years.  Has not tried any weight loss programs/diets in the past Lowest weight in the past 3 years 240 pounds, highest weight 247 Lives with his mother.   Attends a program daily.  Mother packs him breakfast (banana), packs lunch 3x a week, he purchases fast food through his program 2x a week (chicken nuggets and french fries), mother cooks dinner fish, vegeatbles, sweet potato, large snack in the evening- ice cream 3 servings or waffles (2) around 10pm. Stays up late a night- plays [employer], watches you tube until 2AM.  Wakes up daily at 7AM for his program Had been exercising at the gym prior to [facility] under supervision of an aid Drinks diet soda during the day Patient feels he can handle his weight by himself.  Started doing some exercises once weekly- strengthening at home. +h/o prediabetes 2 years ago.  Due for a physical this year with Dr. Atkinson at [facility] in a few weeks.  BCA- 40.7% fat, fat mass 100.8 pounds, , .8 pounds, TBW % 48.3, BMR 2078  7/15/22- Patient for virtual visit today.  He is not present as he is already on the bus going to his program.  His mother wants to proceed forward to discuss his progress.  Jagjit has declined to be weighed since his last visit with me.  He has cut down on his soda, simple carbohydrate rich foods, fatty foods, increased his salads with dinner, decreased his nighttime snacking.  His mother has met with the  to discuss avoiding fast food during the day.  Has been walking with his program but no formal exercise.  His mother is concerned about disrupting his routine too quickly.  Has visit with his PCP next week.  Did not receive my messages about elevated TSH level.    12/10/22- Patient has been taking levothyroxine 25mcg/day repeat TFTs pending.  +6 pounds.  Not exercising.  Eating patterns are the same- program administrators have said they are going to help but have not made a meaningful impact.  +evening eating.  This session was with the patient's mother/HCP/Guardian   3/3/23- Patient is at his group today and this appointment is with his mother Milly.  She reports Jagjit is doing very well on Wegovy 0.5mg/week.  Eating less portions and snacks.  Good hydration.  Lifting some weights for exercise.  Still issues with him going to his group and eating fast food but Milly packs his lunch 3x a week and she cooks his other meals.  He is down about 30 pounds.  She feels he is ambivalent to his weight loss.  Reports he is taking levothyroxine and is euthyroid.    4/12/23- Patient -7 pounds.  Doing well on Wegovy 0.5mg/week except having some epigastric abd pain today- told his mother that he ate too much.  No feeds, n/v.  Told his mother he was feeling better from this morning.  First episode of this issue.  Constipation had resolved from the last conversation without intervention.  Walking and doing squats.  Feeling good about his weight loss.  Reports thyroid is well controlled.  Eating less portions.  Good water intake.    6/10/23- Patient having no negative effects on Wegovy 1mg/week.  States he is doing strength training daily and focusing on a different body part each day.  Also doing walking at his program.  Mother is packing him lunches except on Fridays which he has [employer].  Breakfasts are eggs and bananas.  Likes broccoli and small peppers.  Likes fruits.  Good hydration and protein intake.    8/9/23- Doing well on Wegovy 1mg/week- picked up box of 1.7mg but hasn't started yet.  -4 pounds since last appointment.  Saw his PCP- no medication changes.  Started levothyroxine 50mcg/day about 5 weeks ago- no negative side effects- denies palpitations, anxiety, diarrhea.  Sleep patterns remain irregular- stays up at night watching youtube.  Not eating overnight anymore.  Mother is packing his breakfast and making healthier choices at his group.  Walking daily at his group and doing strength training (weights) at home in AM.  Denies constipation, n/v.    8/1/24- dumbells every morning, occasional walking for exercise in program.  Considering personal training as option.  Mother packing lunches 3x a week, 2 days eating out- takeout/pizza.  Rewarding himself with food.  Wetnt off Wegovy possibly due to constipation- unclear- does not recall having an issue.

## 2024-08-05 NOTE — ASSESSMENT
[FreeTextEntry1] : 29 year old male with morbid obesity, hypothyroidism, h/o downs syndrome Referred for bariatric surgery consultation Labs today- he is fasting Mother to set up personal training, consider consultation with RD- continue packing lunches- discussed avoiding food for rewards Interested in Zepbound- demonstrated how to use pen- will prescribe after labs are received F/U 1 month   8/5/24- Reviewed labs with patient's mother- confirms compliance with levothyroxine 50mcg/day- will increase to 75mcg/day.  Never had normal TFT's after last fall.  Start vitamin D- she prefers weekly dosage.  And start Zepbound.  Consult with Dr. davis in 2 weeks, f/u with me 3 weeks after starting Zepbound.  Repeat lsbs in 3 months- ordered.

## 2024-08-23 ENCOUNTER — APPOINTMENT (OUTPATIENT)
Dept: BARIATRICS | Facility: CLINIC | Age: 29
End: 2024-08-23
Payer: COMMERCIAL

## 2024-08-23 VITALS
DIASTOLIC BLOOD PRESSURE: 81 MMHG | BODY MASS INDEX: 43.59 KG/M2 | HEART RATE: 68 BPM | SYSTOLIC BLOOD PRESSURE: 123 MMHG | WEIGHT: 222 LBS | OXYGEN SATURATION: 98 % | HEIGHT: 60 IN

## 2024-08-23 DIAGNOSIS — I45.10 UNSPECIFIED RIGHT BUNDLE-BRANCH BLOCK: ICD-10-CM

## 2024-08-23 DIAGNOSIS — Q24.9 CONGENITAL MALFORMATION OF HEART, UNSPECIFIED: ICD-10-CM

## 2024-08-23 DIAGNOSIS — E66.01 MORBID (SEVERE) OBESITY DUE TO EXCESS CALORIES: ICD-10-CM

## 2024-08-23 PROCEDURE — 99205 OFFICE O/P NEW HI 60 MIN: CPT

## 2024-08-23 NOTE — ASSESSMENT
[TextEntry] : Subjective:   - Summary: Jagjit Swanson, a 29-year-old male patient, was brought in by his mother for a weight management consultation. 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 Has downs syndrome   - Chief Complaint (CC): Weight management concerns raised by the patient's mother   -     - Chief Complaint: Weight management concerns raised by the patient's mother.          - Characterization of the Discomfort: The patient's mother reported that he gets out of breath, cannot walk long distances, sweats, and had previously lost 20 pounds on medication but regained the weight Symptoms: Shortness of breath, difficulty walking long distances, sweating.             - Comparative Analysis: The patient had previously lost 20 pounds on medication but regained the weight after stopping the medication.     - Patient's Medical Regimen: Currently taking levothyroxine, Zepbound, and vitamin D.     - Contextual Factors: The patient's mother is concerned about his weight and brought him for a consultation.        - Past Medical History: Congenital heart surgery at 5 months old for leaky valves. Down's syndrome   - Past Surgical History: Congenital heart surgery at 5 months old for leaky valves   - Medications: Levothyroxine, Zepbound, and vitamin   - Vital Signs: Blood pressure: 123/81, Pulse: 68, Oxygen saturation: 98%, Height: 5 feet, Weight: 222 pounds.   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 However, the patient himself does not express concern about his weight or a desire for weight management.   - Problems:     - Obesity     - Potential weight-related  Plan:   - Summary: the provider recommends revisiting the discussion if the patient's perspective changes in the future.   -          - Encourage the patient to consider weight management options after discussion with mom who is     - Offer referrals to a dietitian or weight management program if the patient desires additional support.     - Schedule a follow-up appointment to reassess the patient's readiness for weight management interventions. Discussed with mom that when ready have had good experience with individuals with Down's syndrome as they want to please Follow direction Like social interaction with positive feedback   ICD 10 Codes (Beta):   - Code: E66.9   - Description: Obesity, unspecified   - Reason: The patient is reported to have a weight of 222 pounds at a height of 5 feet, which would likely classify as obesity.   - Code: Q24.9   - Description: Congenital malformation of heart, unspecified   - Reason: The patient has a history of congenital heart surgery at 5 months old for leaky

## 2024-10-23 ENCOUNTER — APPOINTMENT (OUTPATIENT)
Dept: BARIATRICS/WEIGHT MGMT | Facility: CLINIC | Age: 29
End: 2024-10-23

## 2024-11-06 ENCOUNTER — APPOINTMENT (OUTPATIENT)
Dept: BARIATRICS/WEIGHT MGMT | Facility: CLINIC | Age: 29
End: 2024-11-06
Payer: COMMERCIAL

## 2024-11-06 DIAGNOSIS — E66.01 MORBID (SEVERE) OBESITY DUE TO EXCESS CALORIES: ICD-10-CM

## 2024-11-06 DIAGNOSIS — E55.9 VITAMIN D DEFICIENCY, UNSPECIFIED: ICD-10-CM

## 2024-11-06 PROCEDURE — 99212 OFFICE O/P EST SF 10 MIN: CPT

## 2024-11-14 DIAGNOSIS — E03.9 HYPOTHYROIDISM, UNSPECIFIED: ICD-10-CM

## 2025-03-24 ENCOUNTER — RX RENEWAL (OUTPATIENT)
Age: 30
End: 2025-03-24

## 2025-05-01 ENCOUNTER — APPOINTMENT (OUTPATIENT)
Dept: BARIATRICS/WEIGHT MGMT | Facility: CLINIC | Age: 30
End: 2025-05-01

## 2025-06-12 ENCOUNTER — APPOINTMENT (OUTPATIENT)
Dept: BARIATRICS/WEIGHT MGMT | Facility: CLINIC | Age: 30
End: 2025-06-12

## 2025-08-07 ENCOUNTER — TRANSCRIPTION ENCOUNTER (OUTPATIENT)
Age: 30
End: 2025-08-07

## 2025-08-07 DIAGNOSIS — E66.01 MORBID (SEVERE) OBESITY DUE TO EXCESS CALORIES: ICD-10-CM

## 2025-08-07 DIAGNOSIS — E03.9 HYPOTHYROIDISM, UNSPECIFIED: ICD-10-CM

## 2025-08-20 RX ORDER — SEMAGLUTIDE 0.25 MG/.5ML
0.25 INJECTION, SOLUTION SUBCUTANEOUS
Qty: 1 | Refills: 0 | Status: ACTIVE | COMMUNITY
Start: 2025-08-20 | End: 1900-01-01

## 2025-09-02 ENCOUNTER — NON-APPOINTMENT (OUTPATIENT)
Age: 30
End: 2025-09-02